# Patient Record
Sex: MALE | Race: WHITE | NOT HISPANIC OR LATINO | Employment: OTHER | URBAN - NONMETROPOLITAN AREA
[De-identification: names, ages, dates, MRNs, and addresses within clinical notes are randomized per-mention and may not be internally consistent; named-entity substitution may affect disease eponyms.]

---

## 2021-06-04 ENCOUNTER — HOSPITAL ENCOUNTER (EMERGENCY)
Facility: HOSPITAL | Age: 46
Discharge: HOME | End: 2021-06-04
Attending: EMERGENCY MEDICINE | Admitting: EMERGENCY MEDICINE

## 2021-06-04 ENCOUNTER — APPOINTMENT (OUTPATIENT)
Dept: CT IMAGING | Facility: HOSPITAL | Age: 46
End: 2021-06-04

## 2021-06-04 VITALS
SYSTOLIC BLOOD PRESSURE: 138 MMHG | BODY MASS INDEX: 21.67 KG/M2 | DIASTOLIC BLOOD PRESSURE: 84 MMHG | WEIGHT: 143 LBS | OXYGEN SATURATION: 97 % | TEMPERATURE: 97.9 F | HEIGHT: 68 IN | HEART RATE: 79 BPM | RESPIRATION RATE: 20 BRPM

## 2021-06-04 DIAGNOSIS — N30.01 ACUTE CYSTITIS WITH HEMATURIA: ICD-10-CM

## 2021-06-04 DIAGNOSIS — N20.1 URETEROLITHIASIS: Primary | ICD-10-CM

## 2021-06-04 LAB
ALBUMIN SERPL-MCNC: 4.23 G/DL (ref 3.5–5.2)
ALBUMIN/GLOB SERPL: 1.4 G/DL
ALP SERPL-CCNC: 70 U/L (ref 39–117)
ALT SERPL W P-5'-P-CCNC: 22 U/L (ref 1–41)
ANION GAP SERPL CALCULATED.3IONS-SCNC: 10.9 MMOL/L (ref 5–15)
AST SERPL-CCNC: 28 U/L (ref 1–40)
BACTERIA UR QL AUTO: ABNORMAL /HPF
BASOPHILS # BLD MANUAL: 0.15 10*3/MM3 (ref 0–0.2)
BASOPHILS NFR BLD AUTO: 2 % (ref 0–1.5)
BILIRUB SERPL-MCNC: 0.2 MG/DL (ref 0–1.2)
BILIRUB UR QL STRIP: NEGATIVE
BUN SERPL-MCNC: 13 MG/DL (ref 6–20)
BUN/CREAT SERPL: 16.3 (ref 7–25)
CALCIUM SPEC-SCNC: 9.1 MG/DL (ref 8.6–10.5)
CHLORIDE SERPL-SCNC: 106 MMOL/L (ref 98–107)
CLARITY UR: ABNORMAL
CO2 SERPL-SCNC: 28.1 MMOL/L (ref 22–29)
COLOR UR: ABNORMAL
CREAT SERPL-MCNC: 0.8 MG/DL (ref 0.76–1.27)
DEPRECATED RDW RBC AUTO: 49.8 FL (ref 37–54)
EOSINOPHIL # BLD MANUAL: 0.45 10*3/MM3 (ref 0–0.4)
EOSINOPHIL NFR BLD MANUAL: 6.1 % (ref 0.3–6.2)
ERYTHROCYTE [DISTWIDTH] IN BLOOD BY AUTOMATED COUNT: 13.9 % (ref 12.3–15.4)
GFR SERPL CREATININE-BSD FRML MDRD: 105 ML/MIN/1.73
GLOBULIN UR ELPH-MCNC: 3 GM/DL
GLUCOSE SERPL-MCNC: 95 MG/DL (ref 65–99)
GLUCOSE UR STRIP-MCNC: NEGATIVE MG/DL
HCT VFR BLD AUTO: 36.2 % (ref 37.5–51)
HGB BLD-MCNC: 11.5 G/DL (ref 13–17.7)
HGB UR QL STRIP.AUTO: ABNORMAL
HYALINE CASTS UR QL AUTO: ABNORMAL /LPF
KETONES UR QL STRIP: NEGATIVE
LEUKOCYTE ESTERASE UR QL STRIP.AUTO: ABNORMAL
LIPASE SERPL-CCNC: 16 U/L (ref 13–60)
LYMPHOCYTES # BLD MANUAL: 2.39 10*3/MM3 (ref 0.7–3.1)
LYMPHOCYTES NFR BLD MANUAL: 32.3 % (ref 19.6–45.3)
LYMPHOCYTES NFR BLD MANUAL: 4 % (ref 5–12)
MCH RBC QN AUTO: 31 PG (ref 26.6–33)
MCHC RBC AUTO-ENTMCNC: 31.8 G/DL (ref 31.5–35.7)
MCV RBC AUTO: 97.6 FL (ref 79–97)
METAMYELOCYTES NFR BLD MANUAL: 1 % (ref 0–0)
MONOCYTES # BLD AUTO: 0.3 10*3/MM3 (ref 0.1–0.9)
MYELOCYTES NFR BLD MANUAL: 2 % (ref 0–0)
NEUTROPHILS # BLD AUTO: 3.89 10*3/MM3 (ref 1.7–7)
NEUTROPHILS NFR BLD MANUAL: 52.5 % (ref 42.7–76)
NITRITE UR QL STRIP: NEGATIVE
PH UR STRIP.AUTO: 6.5 [PH] (ref 5–8)
PLATELET # BLD AUTO: 347 10*3/MM3 (ref 140–450)
PMV BLD AUTO: 9.2 FL (ref 6–12)
POTASSIUM SERPL-SCNC: 3.5 MMOL/L (ref 3.5–5.2)
PROT SERPL-MCNC: 7.2 G/DL (ref 6–8.5)
PROT UR QL STRIP: ABNORMAL
RBC # BLD AUTO: 3.71 10*6/MM3 (ref 4.14–5.8)
RBC # UR: ABNORMAL /HPF
RBC MORPH BLD: NORMAL
REF LAB TEST METHOD: ABNORMAL
SMALL PLATELETS BLD QL SMEAR: ADEQUATE
SODIUM SERPL-SCNC: 145 MMOL/L (ref 136–145)
SP GR UR STRIP: 1.02 (ref 1–1.03)
SQUAMOUS #/AREA URNS HPF: ABNORMAL /HPF
UROBILINOGEN UR QL STRIP: ABNORMAL
WBC # BLD AUTO: 7.4 10*3/MM3 (ref 3.4–10.8)
WBC UR QL AUTO: ABNORMAL /HPF

## 2021-06-04 PROCEDURE — 25010000002 MORPHINE PER 10 MG: Performed by: EMERGENCY MEDICINE

## 2021-06-04 PROCEDURE — 99283 EMERGENCY DEPT VISIT LOW MDM: CPT

## 2021-06-04 PROCEDURE — 85007 BL SMEAR W/DIFF WBC COUNT: CPT | Performed by: PHYSICIAN ASSISTANT

## 2021-06-04 PROCEDURE — 25010000002 CEFTRIAXONE PER 250 MG: Performed by: PHYSICIAN ASSISTANT

## 2021-06-04 PROCEDURE — 80053 COMPREHEN METABOLIC PANEL: CPT | Performed by: PHYSICIAN ASSISTANT

## 2021-06-04 PROCEDURE — 81001 URINALYSIS AUTO W/SCOPE: CPT | Performed by: PHYSICIAN ASSISTANT

## 2021-06-04 PROCEDURE — 85025 COMPLETE CBC W/AUTO DIFF WBC: CPT | Performed by: PHYSICIAN ASSISTANT

## 2021-06-04 PROCEDURE — 87086 URINE CULTURE/COLONY COUNT: CPT | Performed by: PHYSICIAN ASSISTANT

## 2021-06-04 PROCEDURE — 83690 ASSAY OF LIPASE: CPT | Performed by: PHYSICIAN ASSISTANT

## 2021-06-04 PROCEDURE — 74176 CT ABD & PELVIS W/O CONTRAST: CPT

## 2021-06-04 PROCEDURE — 96375 TX/PRO/DX INJ NEW DRUG ADDON: CPT

## 2021-06-04 PROCEDURE — 25010000002 ONDANSETRON PER 1 MG: Performed by: PHYSICIAN ASSISTANT

## 2021-06-04 PROCEDURE — 96365 THER/PROPH/DIAG IV INF INIT: CPT

## 2021-06-04 PROCEDURE — 25010000002 KETOROLAC TROMETHAMINE PER 15 MG: Performed by: PHYSICIAN ASSISTANT

## 2021-06-04 RX ORDER — KETOROLAC TROMETHAMINE 30 MG/ML
30 INJECTION, SOLUTION INTRAMUSCULAR; INTRAVENOUS ONCE
Status: COMPLETED | OUTPATIENT
Start: 2021-06-04 | End: 2021-06-04

## 2021-06-04 RX ORDER — SODIUM CHLORIDE 0.9 % (FLUSH) 0.9 %
10 SYRINGE (ML) INJECTION AS NEEDED
Status: DISCONTINUED | OUTPATIENT
Start: 2021-06-04 | End: 2021-06-05 | Stop reason: HOSPADM

## 2021-06-04 RX ORDER — HYDROCODONE BITARTRATE AND ACETAMINOPHEN 10; 325 MG/1; MG/1
1 TABLET ORAL EVERY 6 HOURS PRN
Qty: 10 TABLET | Refills: 0 | Status: SHIPPED | OUTPATIENT
Start: 2021-06-04

## 2021-06-04 RX ORDER — ONDANSETRON 2 MG/ML
4 INJECTION INTRAMUSCULAR; INTRAVENOUS ONCE
Status: COMPLETED | OUTPATIENT
Start: 2021-06-04 | End: 2021-06-04

## 2021-06-04 RX ORDER — CEPHALEXIN 500 MG/1
500 CAPSULE ORAL 2 TIMES DAILY
Qty: 14 CAPSULE | Refills: 0 | Status: SHIPPED | OUTPATIENT
Start: 2021-06-04 | End: 2021-06-11

## 2021-06-04 RX ORDER — TAMSULOSIN HYDROCHLORIDE 0.4 MG/1
1 CAPSULE ORAL DAILY
Qty: 10 CAPSULE | Refills: 0 | Status: SHIPPED | OUTPATIENT
Start: 2021-06-04 | End: 2021-06-14

## 2021-06-04 RX ADMIN — MORPHINE SULFATE 4 MG: 4 INJECTION, SOLUTION INTRAMUSCULAR; INTRAVENOUS at 22:22

## 2021-06-04 RX ADMIN — ONDANSETRON 4 MG: 2 INJECTION INTRAMUSCULAR; INTRAVENOUS at 21:50

## 2021-06-04 RX ADMIN — SODIUM CHLORIDE 1000 ML: 9 INJECTION, SOLUTION INTRAVENOUS at 21:49

## 2021-06-04 RX ADMIN — KETOROLAC TROMETHAMINE 30 MG: 30 INJECTION, SOLUTION INTRAMUSCULAR; INTRAVENOUS at 21:50

## 2021-06-04 RX ADMIN — CEFTRIAXONE 1 G: 1 INJECTION, POWDER, FOR SOLUTION INTRAMUSCULAR; INTRAVENOUS at 22:20

## 2021-06-05 NOTE — ED PROVIDER NOTES
Subjective   Patient is a 45-year-old male with history of kidney stones and restless leg syndrome presents to the ED with complaints of left-sided flank pain.  He states this started approximately a month ago but is gotten worse in the last several days.  He has a history of kidney stones his last kidney stone was approximately a month ago on the right side that he was able to pass.  He is complaining of an aching pain radiating into the suprapubic region.  He is also complaining of associated nausea and dysuria.  He denies chest pain, shortness of breath, fever, chills, vomiting, hematuria, or diarrhea.      History provided by:  Patient   used: No    Flank Pain  Pain location:  L flank  Pain quality: aching and cramping    Pain radiates to:  Suprapubic region  Pain severity:  Moderate  Onset quality:  Sudden  Duration:  2 days  Timing:  Constant  Progression:  Worsening  Chronicity:  Recurrent  Context: not alcohol use, not awakening from sleep, not diet changes, not eating, not laxative use, not medication withdrawal, not previous surgeries, not recent illness, not recent travel, not retching, not sick contacts, not suspicious food intake and not trauma    Relieved by:  Nothing  Worsened by:  Nothing  Ineffective treatments:  None tried  Associated symptoms: dysuria and nausea    Associated symptoms: no chest pain, no chills, no constipation, no cough, no diarrhea, no fatigue, no fever, no flatus, no hematemesis, no hematochezia, no hematuria, no shortness of breath, no sore throat and no vomiting        Review of Systems   Constitutional: Negative.  Negative for chills, diaphoresis, fatigue and fever.   HENT: Negative.  Negative for congestion, ear discharge, ear pain, facial swelling, hearing loss, postnasal drip, rhinorrhea, sinus pressure, sinus pain, sneezing, sore throat, tinnitus and trouble swallowing.    Eyes: Negative.  Negative for photophobia, pain, discharge, redness and itching.    Respiratory: Negative.  Negative for cough, shortness of breath and wheezing.    Cardiovascular: Negative.  Negative for chest pain, palpitations and leg swelling.   Gastrointestinal: Positive for nausea. Negative for abdominal distention, abdominal pain, constipation, diarrhea, flatus, hematemesis, hematochezia and vomiting.   Endocrine: Negative.    Genitourinary: Positive for dysuria and flank pain. Negative for difficulty urinating, frequency, hematuria and urgency.   Musculoskeletal: Negative for arthralgias, back pain, gait problem, joint swelling, myalgias, neck pain and neck stiffness.   Skin: Negative.    Neurological: Negative.  Negative for dizziness, syncope, facial asymmetry, weakness, light-headedness, numbness and headaches.   Psychiatric/Behavioral: Negative.  Negative for confusion.   All other systems reviewed and are negative.      Past Medical History:   Diagnosis Date   • Kidney stone    • Restless leg syndrome        No Known Allergies    Past Surgical History:   Procedure Laterality Date   • APPENDECTOMY     • BACK SURGERY     • DENTAL PROCEDURE     • KNEE ACL RECONSTRUCTION     • NECK SURGERY      x3       No family history on file.    Social History     Socioeconomic History   • Marital status: Single     Spouse name: Not on file   • Number of children: Not on file   • Years of education: Not on file   • Highest education level: Not on file   Tobacco Use   • Smoking status: Current Every Day Smoker     Packs/day: 0.50     Types: Cigarettes   Substance and Sexual Activity   • Alcohol use: No   • Drug use: No   • Sexual activity: Defer           Objective   Physical Exam  Vitals and nursing note reviewed.   Constitutional:       General: He is not in acute distress.     Appearance: He is well-developed. He is not diaphoretic.   HENT:      Head: Normocephalic and atraumatic.      Right Ear: External ear normal.      Left Ear: External ear normal.      Nose: Nose normal.      Mouth/Throat:       Mouth: Mucous membranes are moist.      Pharynx: Oropharynx is clear.   Eyes:      Extraocular Movements: Extraocular movements intact.      Conjunctiva/sclera: Conjunctivae normal.      Pupils: Pupils are equal, round, and reactive to light.   Neck:      Vascular: No JVD.      Trachea: No tracheal deviation.   Cardiovascular:      Rate and Rhythm: Normal rate and regular rhythm.      Heart sounds: Normal heart sounds. No murmur heard.     Pulmonary:      Effort: Pulmonary effort is normal. No respiratory distress.      Breath sounds: Normal breath sounds. No wheezing.   Abdominal:      General: Bowel sounds are normal. There is no distension.      Palpations: Abdomen is soft.      Tenderness: There is no abdominal tenderness. There is left CVA tenderness. There is no guarding or rebound.   Musculoskeletal:         General: No deformity. Normal range of motion.      Cervical back: Normal range of motion and neck supple.      Right lower leg: No edema.      Left lower leg: No edema.   Skin:     General: Skin is warm and dry.      Coloration: Skin is not pale.      Findings: No erythema or rash.   Neurological:      Mental Status: He is alert and oriented to person, place, and time.      Cranial Nerves: No cranial nerve deficit.   Psychiatric:         Behavior: Behavior normal.         Thought Content: Thought content normal.         Procedures           ED Course  ED Course as of Jun 04 2231   Fri Jun 04, 2021 2217 IMPRESSION:  Prominent stone is seen involving the patient's left UPJ. . The left renal collecting system proximal to this is slightly prominent although there is no gross hydronephrosis. It is uncertain if this stone may be contributing to left-sided renal colic.        Signer Name: Janet Bennett MD   Signed: 6/4/2021 10:01 PM   CT Abdomen Pelvis Stone Protocol []   2231 Discussed plan of care with patient.  Advised if symptoms worsen return to the ED.  Advised to follow-up with urology in 1 to 2 days.     []      ED Course User Index  [MH] Tiffanie Vora PA-C                                           [facility]    Final diagnoses:   Ureterolithiasis   Acute cystitis with hematuria       ED Disposition  ED Disposition     ED Disposition Condition Comment    Discharge Stable           Chace Capps MD  [address]  [facility] [address]  [phone]    Schedule an appointment as soon as possible for a visit in 1 day           Medication List      New Prescriptions    cephalexin 500 MG capsule  Commonly known as: KEFLEX  Take 1 capsule by mouth 2 (Two) Times a Day for 7 days.     tamsulosin 0.4 MG capsule 24 hr capsule  Commonly known as: FLOMAX  Take 1 capsule by mouth Daily for 10 days.           Where to Get Your Medications      You can get these medications from any pharmacy    Bring a paper prescription for each of these medications  · cephalexin 500 MG capsule  · tamsulosin 0.4 MG capsule 24 hr capsule          Tiffanie Vora PA-C  06/04/21 5545

## 2021-06-06 LAB — BACTERIA SPEC AEROBE CULT: NO GROWTH

## 2023-09-05 ENCOUNTER — HOSPITAL ENCOUNTER (INPATIENT)
Facility: HOSPITAL | Age: 48
LOS: 2 days | Discharge: HOME | DRG: 897 | End: 2023-09-07
Attending: PSYCHIATRY & NEUROLOGY | Admitting: PSYCHIATRY & NEUROLOGY
Payer: MEDICARE

## 2023-09-05 ENCOUNTER — HOSPITAL ENCOUNTER (EMERGENCY)
Facility: HOSPITAL | Age: 48
Discharge: STILL ADMITTED OR ADMINISTRATIVE | DRG: 897 | End: 2023-09-05
Attending: STUDENT IN AN ORGANIZED HEALTH CARE EDUCATION/TRAINING PROGRAM
Payer: MEDICARE

## 2023-09-05 VITALS
HEIGHT: 69 IN | WEIGHT: 150 LBS | SYSTOLIC BLOOD PRESSURE: 146 MMHG | HEART RATE: 94 BPM | BODY MASS INDEX: 22.22 KG/M2 | OXYGEN SATURATION: 97 % | RESPIRATION RATE: 20 BRPM | TEMPERATURE: 97.7 F | DIASTOLIC BLOOD PRESSURE: 82 MMHG

## 2023-09-05 DIAGNOSIS — F19.10 POLYSUBSTANCE ABUSE: Primary | ICD-10-CM

## 2023-09-05 PROBLEM — F11.20 OPIOID USE DISORDER, SEVERE, DEPENDENCE: Status: ACTIVE | Noted: 2023-09-05

## 2023-09-05 PROBLEM — F15.20 METHAMPHETAMINE USE DISORDER, SEVERE: Status: ACTIVE | Noted: 2023-09-05

## 2023-09-05 PROBLEM — F17.200 NICOTINE USE DISORDER: Status: ACTIVE | Noted: 2023-09-05

## 2023-09-05 PROBLEM — F11.93 OPIOID WITHDRAWAL: Status: ACTIVE | Noted: 2023-09-05

## 2023-09-05 LAB
ALBUMIN SERPL-MCNC: 4.4 G/DL (ref 3.5–5.2)
ALBUMIN/GLOB SERPL: 1.3 G/DL
ALP SERPL-CCNC: 83 U/L (ref 39–117)
ALT SERPL W P-5'-P-CCNC: 16 U/L (ref 1–41)
AMPHET+METHAMPHET UR QL: NEGATIVE
AMPHETAMINES UR QL: POSITIVE
ANION GAP SERPL CALCULATED.3IONS-SCNC: 11.9 MMOL/L (ref 5–15)
AST SERPL-CCNC: 22 U/L (ref 1–40)
BACTERIA UR QL AUTO: ABNORMAL /HPF
BARBITURATES UR QL SCN: NEGATIVE
BASOPHILS # BLD AUTO: 0.05 10*3/MM3 (ref 0–0.2)
BASOPHILS NFR BLD AUTO: 0.6 % (ref 0–1.5)
BENZODIAZ UR QL SCN: NEGATIVE
BILIRUB SERPL-MCNC: 0.3 MG/DL (ref 0–1.2)
BILIRUB UR QL STRIP: NEGATIVE
BUN SERPL-MCNC: 12 MG/DL (ref 6–20)
BUN/CREAT SERPL: 18.2 (ref 7–25)
BUPRENORPHINE SERPL-MCNC: POSITIVE NG/ML
CALCIUM SPEC-SCNC: 9.8 MG/DL (ref 8.6–10.5)
CANNABINOIDS SERPL QL: POSITIVE
CHLORIDE SERPL-SCNC: 104 MMOL/L (ref 98–107)
CLARITY UR: CLEAR
CO2 SERPL-SCNC: 23.1 MMOL/L (ref 22–29)
COCAINE UR QL: NEGATIVE
COD CRY URNS QL: ABNORMAL /HPF
COLOR UR: YELLOW
CREAT SERPL-MCNC: 0.66 MG/DL (ref 0.76–1.27)
DEPRECATED RDW RBC AUTO: 44.2 FL (ref 37–54)
EGFRCR SERPLBLD CKD-EPI 2021: 116.4 ML/MIN/1.73
EOSINOPHIL # BLD AUTO: 0 10*3/MM3 (ref 0–0.4)
EOSINOPHIL NFR BLD AUTO: 0 % (ref 0.3–6.2)
ERYTHROCYTE [DISTWIDTH] IN BLOOD BY AUTOMATED COUNT: 13.3 % (ref 12.3–15.4)
ETHANOL BLD-MCNC: <10 MG/DL (ref 0–10)
ETHANOL UR QL: <0.01 %
FENTANYL UR-MCNC: POSITIVE NG/ML
GLOBULIN UR ELPH-MCNC: 3.5 GM/DL
GLUCOSE SERPL-MCNC: 133 MG/DL (ref 65–99)
GLUCOSE UR STRIP-MCNC: NEGATIVE MG/DL
HAV IGM SERPL QL IA: NORMAL
HBV CORE IGM SERPL QL IA: NORMAL
HBV SURFACE AG SERPL QL IA: NORMAL
HCT VFR BLD AUTO: 40.9 % (ref 37.5–51)
HCV AB SER DONR QL: NORMAL
HGB BLD-MCNC: 13.3 G/DL (ref 13–17.7)
HGB UR QL STRIP.AUTO: NEGATIVE
HYALINE CASTS UR QL AUTO: ABNORMAL /LPF
IMM GRANULOCYTES # BLD AUTO: 0.03 10*3/MM3 (ref 0–0.05)
IMM GRANULOCYTES NFR BLD AUTO: 0.3 % (ref 0–0.5)
KETONES UR QL STRIP: ABNORMAL
LEUKOCYTE ESTERASE UR QL STRIP.AUTO: ABNORMAL
LYMPHOCYTES # BLD AUTO: 0.97 10*3/MM3 (ref 0.7–3.1)
LYMPHOCYTES NFR BLD AUTO: 10.9 % (ref 19.6–45.3)
MAGNESIUM SERPL-MCNC: 2.1 MG/DL (ref 1.6–2.6)
MCH RBC QN AUTO: 29.5 PG (ref 26.6–33)
MCHC RBC AUTO-ENTMCNC: 32.5 G/DL (ref 31.5–35.7)
MCV RBC AUTO: 90.7 FL (ref 79–97)
METHADONE UR QL SCN: NEGATIVE
MONOCYTES # BLD AUTO: 0.36 10*3/MM3 (ref 0.1–0.9)
MONOCYTES NFR BLD AUTO: 4 % (ref 5–12)
MUCOUS THREADS URNS QL MICRO: ABNORMAL /HPF
NEUTROPHILS NFR BLD AUTO: 7.48 10*3/MM3 (ref 1.7–7)
NEUTROPHILS NFR BLD AUTO: 84.2 % (ref 42.7–76)
NITRITE UR QL STRIP: NEGATIVE
NRBC BLD AUTO-RTO: 0 /100 WBC (ref 0–0.2)
OPIATES UR QL: NEGATIVE
OXYCODONE UR QL SCN: NEGATIVE
PCP UR QL SCN: NEGATIVE
PH UR STRIP.AUTO: 6.5 [PH] (ref 5–8)
PLATELET # BLD AUTO: 451 10*3/MM3 (ref 140–450)
PMV BLD AUTO: 9 FL (ref 6–12)
POTASSIUM SERPL-SCNC: 3.5 MMOL/L (ref 3.5–5.2)
PROPOXYPH UR QL: NEGATIVE
PROT SERPL-MCNC: 7.9 G/DL (ref 6–8.5)
PROT UR QL STRIP: ABNORMAL
RBC # BLD AUTO: 4.51 10*6/MM3 (ref 4.14–5.8)
RBC # UR STRIP: ABNORMAL /HPF
REF LAB TEST METHOD: ABNORMAL
SODIUM SERPL-SCNC: 139 MMOL/L (ref 136–145)
SP GR UR STRIP: 1.03 (ref 1–1.03)
SQUAMOUS #/AREA URNS HPF: ABNORMAL /HPF
TRICYCLICS UR QL SCN: NEGATIVE
UROBILINOGEN UR QL STRIP: ABNORMAL
WBC # UR STRIP: ABNORMAL /HPF
WBC NRBC COR # BLD: 8.89 10*3/MM3 (ref 3.4–10.8)

## 2023-09-05 PROCEDURE — 93005 ELECTROCARDIOGRAM TRACING: CPT | Performed by: PSYCHIATRY & NEUROLOGY

## 2023-09-05 PROCEDURE — 85025 COMPLETE CBC W/AUTO DIFF WBC: CPT | Performed by: STUDENT IN AN ORGANIZED HEALTH CARE EDUCATION/TRAINING PROGRAM

## 2023-09-05 PROCEDURE — 82077 ASSAY SPEC XCP UR&BREATH IA: CPT | Performed by: STUDENT IN AN ORGANIZED HEALTH CARE EDUCATION/TRAINING PROGRAM

## 2023-09-05 PROCEDURE — 63710000001 ONDANSETRON PER 8 MG: Performed by: PSYCHIATRY & NEUROLOGY

## 2023-09-05 PROCEDURE — HZ2ZZZZ DETOXIFICATION SERVICES FOR SUBSTANCE ABUSE TREATMENT: ICD-10-PCS | Performed by: PSYCHIATRY & NEUROLOGY

## 2023-09-05 PROCEDURE — 83735 ASSAY OF MAGNESIUM: CPT | Performed by: STUDENT IN AN ORGANIZED HEALTH CARE EDUCATION/TRAINING PROGRAM

## 2023-09-05 PROCEDURE — 99222 1ST HOSP IP/OBS MODERATE 55: CPT | Performed by: PSYCHIATRY & NEUROLOGY

## 2023-09-05 PROCEDURE — 25010000002 LORAZEPAM PER 2 MG: Performed by: STUDENT IN AN ORGANIZED HEALTH CARE EDUCATION/TRAINING PROGRAM

## 2023-09-05 PROCEDURE — 80053 COMPREHEN METABOLIC PANEL: CPT | Performed by: STUDENT IN AN ORGANIZED HEALTH CARE EDUCATION/TRAINING PROGRAM

## 2023-09-05 PROCEDURE — 36415 COLL VENOUS BLD VENIPUNCTURE: CPT

## 2023-09-05 PROCEDURE — 99285 EMERGENCY DEPT VISIT HI MDM: CPT

## 2023-09-05 PROCEDURE — 80074 ACUTE HEPATITIS PANEL: CPT | Performed by: PSYCHIATRY & NEUROLOGY

## 2023-09-05 PROCEDURE — 81001 URINALYSIS AUTO W/SCOPE: CPT | Performed by: STUDENT IN AN ORGANIZED HEALTH CARE EDUCATION/TRAINING PROGRAM

## 2023-09-05 PROCEDURE — 80307 DRUG TEST PRSMV CHEM ANLYZR: CPT | Performed by: STUDENT IN AN ORGANIZED HEALTH CARE EDUCATION/TRAINING PROGRAM

## 2023-09-05 PROCEDURE — 96372 THER/PROPH/DIAG INJ SC/IM: CPT

## 2023-09-05 RX ORDER — FAMOTIDINE 20 MG/1
20 TABLET, FILM COATED ORAL 2 TIMES DAILY PRN
Status: DISCONTINUED | OUTPATIENT
Start: 2023-09-05 | End: 2023-09-07 | Stop reason: HOSPADM

## 2023-09-05 RX ORDER — HYDROXYZINE 50 MG/1
50 TABLET, FILM COATED ORAL EVERY 6 HOURS PRN
Status: DISCONTINUED | OUTPATIENT
Start: 2023-09-05 | End: 2023-09-07 | Stop reason: HOSPADM

## 2023-09-05 RX ORDER — CLONIDINE HYDROCHLORIDE 0.1 MG/1
0.1 TABLET ORAL 4 TIMES DAILY PRN
Status: DISPENSED | OUTPATIENT
Start: 2023-09-05 | End: 2023-09-06

## 2023-09-05 RX ORDER — BENZTROPINE MESYLATE 1 MG/1
2 TABLET ORAL ONCE AS NEEDED
Status: DISCONTINUED | OUTPATIENT
Start: 2023-09-05 | End: 2023-09-07 | Stop reason: HOSPADM

## 2023-09-05 RX ORDER — CYCLOBENZAPRINE HCL 10 MG
10 TABLET ORAL 3 TIMES DAILY PRN
Status: DISCONTINUED | OUTPATIENT
Start: 2023-09-05 | End: 2023-09-07 | Stop reason: HOSPADM

## 2023-09-05 RX ORDER — IBUPROFEN 400 MG/1
400 TABLET ORAL EVERY 6 HOURS PRN
Status: DISCONTINUED | OUTPATIENT
Start: 2023-09-05 | End: 2023-09-07 | Stop reason: HOSPADM

## 2023-09-05 RX ORDER — ECHINACEA PURPUREA EXTRACT 125 MG
2 TABLET ORAL AS NEEDED
Status: DISCONTINUED | OUTPATIENT
Start: 2023-09-05 | End: 2023-09-07 | Stop reason: HOSPADM

## 2023-09-05 RX ORDER — BENZTROPINE MESYLATE 1 MG/ML
1 INJECTION INTRAMUSCULAR; INTRAVENOUS ONCE AS NEEDED
Status: DISCONTINUED | OUTPATIENT
Start: 2023-09-05 | End: 2023-09-07 | Stop reason: HOSPADM

## 2023-09-05 RX ORDER — CLONIDINE HYDROCHLORIDE 0.1 MG/1
0.1 TABLET ORAL ONCE AS NEEDED
Status: DISCONTINUED | OUTPATIENT
Start: 2023-09-09 | End: 2023-09-07 | Stop reason: HOSPADM

## 2023-09-05 RX ORDER — CLONIDINE HYDROCHLORIDE 0.1 MG/1
0.1 TABLET ORAL 3 TIMES DAILY PRN
Status: DISCONTINUED | OUTPATIENT
Start: 2023-09-07 | End: 2023-09-07 | Stop reason: HOSPADM

## 2023-09-05 RX ORDER — ACETAMINOPHEN 325 MG/1
650 TABLET ORAL EVERY 6 HOURS PRN
Status: DISCONTINUED | OUTPATIENT
Start: 2023-09-05 | End: 2023-09-07 | Stop reason: HOSPADM

## 2023-09-05 RX ORDER — HYDRALAZINE HYDROCHLORIDE 25 MG/1
25 TABLET, FILM COATED ORAL DAILY PRN
Status: DISCONTINUED | OUTPATIENT
Start: 2023-09-05 | End: 2023-09-07 | Stop reason: HOSPADM

## 2023-09-05 RX ORDER — TRAZODONE HYDROCHLORIDE 50 MG/1
50 TABLET ORAL NIGHTLY PRN
Status: DISCONTINUED | OUTPATIENT
Start: 2023-09-05 | End: 2023-09-07 | Stop reason: HOSPADM

## 2023-09-05 RX ORDER — NICOTINE 21 MG/24HR
1 PATCH, TRANSDERMAL 24 HOURS TRANSDERMAL
Status: DISCONTINUED | OUTPATIENT
Start: 2023-09-05 | End: 2023-09-07 | Stop reason: HOSPADM

## 2023-09-05 RX ORDER — DICYCLOMINE HYDROCHLORIDE 10 MG/1
10 CAPSULE ORAL 3 TIMES DAILY PRN
Status: DISCONTINUED | OUTPATIENT
Start: 2023-09-05 | End: 2023-09-07 | Stop reason: HOSPADM

## 2023-09-05 RX ORDER — CLONIDINE HYDROCHLORIDE 0.1 MG/1
0.1 TABLET ORAL 4 TIMES DAILY PRN
Status: ACTIVE | OUTPATIENT
Start: 2023-09-06 | End: 2023-09-07

## 2023-09-05 RX ORDER — LORAZEPAM 2 MG/ML
2 INJECTION INTRAMUSCULAR ONCE
Status: COMPLETED | OUTPATIENT
Start: 2023-09-05 | End: 2023-09-05

## 2023-09-05 RX ORDER — BENZONATATE 100 MG/1
100 CAPSULE ORAL 3 TIMES DAILY PRN
Status: DISCONTINUED | OUTPATIENT
Start: 2023-09-05 | End: 2023-09-07 | Stop reason: HOSPADM

## 2023-09-05 RX ORDER — ALUMINA, MAGNESIA, AND SIMETHICONE 2400; 2400; 240 MG/30ML; MG/30ML; MG/30ML
15 SUSPENSION ORAL EVERY 6 HOURS PRN
Status: DISCONTINUED | OUTPATIENT
Start: 2023-09-05 | End: 2023-09-07 | Stop reason: HOSPADM

## 2023-09-05 RX ORDER — CLONIDINE HYDROCHLORIDE 0.1 MG/1
0.1 TABLET ORAL 2 TIMES DAILY PRN
Status: DISCONTINUED | OUTPATIENT
Start: 2023-09-08 | End: 2023-09-07 | Stop reason: HOSPADM

## 2023-09-05 RX ORDER — ONDANSETRON 4 MG/1
4 TABLET, FILM COATED ORAL EVERY 6 HOURS PRN
Status: DISCONTINUED | OUTPATIENT
Start: 2023-09-05 | End: 2023-09-07 | Stop reason: HOSPADM

## 2023-09-05 RX ORDER — LOPERAMIDE HYDROCHLORIDE 2 MG/1
2 CAPSULE ORAL
Status: DISCONTINUED | OUTPATIENT
Start: 2023-09-05 | End: 2023-09-07 | Stop reason: HOSPADM

## 2023-09-05 RX ADMIN — CYCLOBENZAPRINE 10 MG: 10 TABLET, FILM COATED ORAL at 08:22

## 2023-09-05 RX ADMIN — DICYCLOMINE HYDROCHLORIDE 10 MG: 10 CAPSULE ORAL at 08:22

## 2023-09-05 RX ADMIN — IBUPROFEN 400 MG: 400 TABLET, FILM COATED ORAL at 08:22

## 2023-09-05 RX ADMIN — CLONIDINE HYDROCHLORIDE 0.1 MG: 0.1 TABLET ORAL at 08:22

## 2023-09-05 RX ADMIN — ONDANSETRON HYDROCHLORIDE 4 MG: 4 TABLET, FILM COATED ORAL at 08:22

## 2023-09-05 RX ADMIN — HYDROXYZINE HYDROCHLORIDE 50 MG: 50 TABLET ORAL at 15:27

## 2023-09-05 RX ADMIN — HYDROXYZINE HYDROCHLORIDE 50 MG: 50 TABLET ORAL at 08:22

## 2023-09-05 RX ADMIN — LORAZEPAM 2 MG: 2 INJECTION INTRAMUSCULAR; INTRAVENOUS at 05:18

## 2023-09-05 RX ADMIN — TRAZODONE HYDROCHLORIDE 50 MG: 50 TABLET ORAL at 21:29

## 2023-09-05 RX ADMIN — HYDROXYZINE HYDROCHLORIDE 50 MG: 50 TABLET ORAL at 21:29

## 2023-09-05 NOTE — H&P
INITIAL PSYCHIATRIC HISTORY & PHYSICAL    Patient Identification:  Name:  Anatoly Buenrostro  Age:  47 y.o.  Sex:  male  :  1975  MRN:  [id number]   Visit Number:  [id number]  Primary Care Physician:  Provider, No Known    SUBJECTIVE    CC/Focus of Exam: opioid and meth use    HPI: Anatoly Buenrostro is a 47 y.o. male who was admitted on 2023 with complaints of opioid and meth use and withdrawals. The patient reports a long history of substance use. First use was at age 18 when he was prescribed pain medications for neck injury. He continued to use after the prescription was stopped. He added meth to the mix in his 30s. Over time the use increased and the patient  continued to use despite negative consequences. The patient endorses symptoms of tolerance and withdrawals. Has tried to cut down and stop but has not been successful. Spends too much time and resources in pursuit of substance use. Longest period of sobriety is reported to be a couple of weeks. .  Currently using heroin as much as he can via snorting, and a little bit of meth daily intranasally.   Last use was two days ago.   Withdrawal symptoms include body aches, cramping in stomach, arms and legs, anxiety, restlessness, poor sleep.     PAST PSYCHIATRIC HX: None reported.     SUBSTANCE USE HX: See HPI.     SOCIAL HX:   Social History     Socioeconomic History    Marital status: Single   Tobacco Use    Smoking status: Every Day     Packs/day: 1.00     Years: 30.00     Pack years: 30.00     Types: Cigarettes   Vaping Use    Vaping Use: Never used   Substance and Sexual Activity    Alcohol use: No    Drug use: Yes     Types: Heroin, Methamphetamines    Sexual activity: Defer         Past Medical History:   Diagnosis Date    Kidney stone     Restless leg syndrome     Substance abuse           Past Surgical History:   Procedure Laterality Date    APPENDECTOMY      BACK SURGERY      DENTAL PROCEDURE      KNEE ACL RECONSTRUCTION      NECK SURGERY      x3        History reviewed. No pertinent family history.      No medications prior to admission.         ALLERGIES:  Patient has no known allergies.    Temp:  [97.7 °F (36.5 °C)-98.5 °F (36.9 °C)] 98.5 °F (36.9 °C)  Heart Rate:  [] 93  Resp:  [18-20] 18  BP: (110-154)/(69-96) 115/75    REVIEW OF SYSTEMS:  Review of Systems   Constitutional:  Positive for diaphoresis and fatigue.   HENT:  Positive for congestion.    Eyes: Negative.    Respiratory: Negative.     Cardiovascular: Negative.    Gastrointestinal:  Positive for nausea.   Endocrine: Negative.    Genitourinary: Negative.    Musculoskeletal:  Positive for arthralgias and myalgias.   Skin: Negative.    Allergic/Immunologic: Negative.    Neurological:  Positive for weakness.   Psychiatric/Behavioral:  Positive for dysphoric mood. The patient is nervous/anxious.       OBJECTIVE    PHYSICAL EXAM:  Physical Exam  Constitutional:  Appears well-developed and well-nourished.   HENT:   Head: Normocephalic and atraumatic.   Right Ear: External ear normal.   Left Ear: External ear normal.   Mouth/Throat: Oropharynx is clear and moist.   Eyes: Pupils are equal, round, and reactive to light. Conjunctivae and EOM are normal.   Neck: Normal range of motion. Neck supple.   Cardiovascular: Normal rate, regular rhythm and normal heart sounds.    Respiratory: Effort normal and breath sounds normal. No respiratory distress. No wheezes.   GI: Soft. Bowel sounds are normal.No distension. There is no tenderness.   Musculoskeletal: Normal range of motion. No edema or deformity.   Neurological:No cranial nerve deficit. Coordination normal.   Skin: Skin is warm and dry. No rash noted. No erythema.     MENTAL STATUS EXAM:   Hygiene:   poor  Cooperation:  Cooperative  Eye Contact:  Fair  Psychomotor Behavior:  Slow  Affect:  Restricted  Hopelessness: Denies  Speech:  Normal  Thought Progress: Goal directed  Thought Content:  Normal  Suicidal:  None  Homicidal:  None  Hallucinations:   None  Delusion:  None  Memory:  Intact  Orientation:  Person, Place, Time, and Situation  Reliability:  fair  Insight:  Fair  Judgement:  Fair  Impulse Control:  Fair    Imaging Results (Last 24 Hours)       ** No results found for the last 24 hours. **             ECG/EMG Results (most recent)       None             Lab Results   Component Value Date    GLUCOSE 133 (H) 09/05/2023    BUN 12 09/05/2023    CREATININE 0.66 (L) 09/05/2023    EGFRIFNONA 105 06/04/2021    BCR 18.2 09/05/2023    CO2 23.1 09/05/2023    CALCIUM 9.8 09/05/2023    ALBUMIN 4.4 09/05/2023    LABIL2 1.4 (L) 02/12/2016    AST 22 09/05/2023    ALT 16 09/05/2023       Lab Results   Component Value Date    WBC 8.89 09/05/2023    HGB 13.3 09/05/2023    HCT 40.9 09/05/2023    MCV 90.7 09/05/2023     (H) 09/05/2023       Last Urine Toxicity  More data exists         Latest Ref Rng & Units 9/5/2023 7/16/2016   LAST URINE TOXICITY RESULTS   Amphetamine, Urine Qual Negative Negative  Negative    Barbiturates Screen, Urine Negative Negative  Negative    Benzodiazepine Screen, Urine Negative Negative  Negative    Buprenorphine, Screen, Urine Negative Positive  -   Cocaine Screen, Urine Negative Negative  Negative    Fentanyl, Urine Negative Positive  -   Methadone Screen , Urine Negative Negative  Negative    Methamphetamine, Ur Negative Positive  -       Brief Urine Lab Results  (Last result in the past 365 days)        Color   Clarity   Blood   Leuk Est   Nitrite   Protein   CREAT   Urine HCG        09/05/23 0438 Yellow   Clear   Negative   Trace   Negative   Trace                   DATA  Labs reviewed. Glucose 133. Platelets 451. Ketones 15, trace leukocyte esterase, trace protein, trace bacteria. UDS positive for buprenorphine, fentanyl, methamphetamine, thc.   EKG reviewed. Pending  FERNANDO reviewed.   Record reviewed. No previous treatment noted in this hospital for mental health or substance use problems.       Strengths: Motivated for  treatment    Weaknesses:Substance use and Poor coping skills    Code status:  Full  Discussed code status with patient.    ASSESSMENT & PLAN:        Opioid use disorder, severe, dependence    Opioid withdrawal  -Clonidine detox  -Comfort meds      Methamphetamine use disorder, severe  -Supportive treatment      Nicotine use disorder  -Encourage cessation      The patient has been admitted for safety and stabilization.  Patient will be monitored for suicidality daily and maintained on Special Precautions Level 4 (q30 min checks).  The patient will have individual and group therapy with a master's level therapist. A master treatment plan will be developed and agreed upon by the patient and his/her treatment team.  The patient's estimated length of stay in the hospital is 5-7 days.

## 2023-09-05 NOTE — NURSING NOTE
Spoke with Dr. Cohen, discussed assessment and labs, new orders to admit the patient to detox with routine orders, SP4, clonidine detox protocol. [id number]

## 2023-09-05 NOTE — PLAN OF CARE
Problem: Adult Behavioral Health Plan of Care  Goal: Plan of Care Review  Outcome: Ongoing, Progressing  Flowsheets (Taken 9/5/2023 4273)  Progress: no change  Plan of Care Reviewed With: patient  Patient Agreement with Plan of Care: agrees  Outcome Evaluation: Pt restless and can't be still during assessment.Pt having several somatic complaints.   Goal Outcome Evaluation:  Plan of Care Reviewed With: patient  Patient Agreement with Plan of Care: agrees     Progress: no change  Outcome Evaluation: Pt restless and can't be still during assessment.Pt having several somatic complaints.

## 2023-09-05 NOTE — ED PROVIDER NOTES
Subjective     History provided by:  Patient  Mental Health Problem  Presenting symptoms: agitation and bizarre behavior    Degree of incapacity (severity):  Severe  Onset quality:  Sudden  Duration:  1 day  Timing:  Constant  Progression:  Worsening  Chronicity:  Recurrent  Context: drug abuse    Treatment compliance:  Some of the time  Associated symptoms: anxiety and irritability    Associated symptoms: no abdominal pain and no chest pain    Risk factors: hx of mental illness      Review of Systems   Constitutional:  Positive for irritability. Negative for fever.   HENT: Negative.     Respiratory: Negative.     Cardiovascular: Negative.  Negative for chest pain.   Gastrointestinal: Negative.  Negative for abdominal pain.   Endocrine: Negative.    Genitourinary: Negative.  Negative for dysuria.   Skin: Negative.    Neurological: Negative.    Psychiatric/Behavioral:  Positive for agitation and behavioral problems. The patient is nervous/anxious.    All other systems reviewed and are negative.    Past Medical History:   Diagnosis Date    Kidney stone     Restless leg syndrome     Substance abuse        No Known Allergies    Past Surgical History:   Procedure Laterality Date    APPENDECTOMY      BACK SURGERY      DENTAL PROCEDURE      KNEE ACL RECONSTRUCTION      NECK SURGERY      x3       History reviewed. No pertinent family history.    Social History     Socioeconomic History    Marital status: Single   Tobacco Use    Smoking status: Every Day     Packs/day: 1.00     Years: 30.00     Pack years: 30.00     Types: Cigarettes   Vaping Use    Vaping Use: Never used   Substance and Sexual Activity    Alcohol use: No    Drug use: Yes     Types: Heroin, Methamphetamines    Sexual activity: Defer           Objective   Physical Exam  Vitals and nursing note reviewed.   Constitutional:       General: He is not in acute distress.     Appearance: He is well-developed. He is not diaphoretic.   HENT:      Head: Normocephalic  and atraumatic.      Right Ear: External ear normal.      Left Ear: External ear normal.      Nose: Nose normal.   Eyes:      Conjunctiva/sclera: Conjunctivae normal.   Neck:      Vascular: No JVD.      Trachea: No tracheal deviation.   Cardiovascular:      Rate and Rhythm: Normal rate.      Heart sounds: No murmur heard.  Pulmonary:      Effort: Pulmonary effort is normal. No respiratory distress.      Breath sounds: No wheezing.   Abdominal:      Palpations: Abdomen is soft.      Tenderness: There is no abdominal tenderness.   Musculoskeletal:         General: No deformity. Normal range of motion.      Cervical back: Normal range of motion and neck supple.   Skin:     General: Skin is warm and dry.      Coloration: Skin is not pale.      Findings: No erythema or rash.   Neurological:      Mental Status: He is alert and oriented to person, place, and time.      Cranial Nerves: No cranial nerve deficit.   Psychiatric:      Comments: Patient verbalized polysubstance abuse.  Patient wishes to detox off of methamphetamine and heroin.  Last use was 1 day prior to arrival.       Procedures           ED Course  ED Course as of 09/05/23 0612   Tue Sep 05, 2023   0546 Patient been evaluated by our behavioral health department and deemed appropriate for admission. [RB]      ED Course User Index  [RB] Henrry Chow II, PA                                           Medical Decision Making  47-year-old with polysubstance abuse.  Presents requesting detox of heroin and methamphetamines.    Problems Addressed:  Polysubstance abuse: complicated acute illness or injury     Details: Patient was evaluated in the emergency department by our behavioral health department and deemed the patient benefit from inpatient detox.  Patient be admitted.    Amount and/or Complexity of Data Reviewed  Labs: ordered.    Risk  Prescription drug management.        Final diagnoses:   Polysubstance abuse       ED Disposition  ED Disposition       ED  Disposition   DC/Transfer to Behavioral Health    Condition   --    Comment   --               No follow-up provider specified.       Medication List      No changes were made to your prescriptions during this visit.            Henrry Chow II, PA  09/05/23 0604

## 2023-09-05 NOTE — PLAN OF CARE
Problem: Adult Behavioral Health Plan of Care  Goal: Plan of Care Review  Outcome: Ongoing, Progressing  Flowsheets (Taken 9/5/2023 1650)  Consent Given to Review Plan with: no consent  Progress: no change  Plan of Care Reviewed With: patient  Patient Agreement with Plan of Care: agrees  Outcome Evaluation: Reviewed plan of care and completed adult social history.  Goal: Patient-Specific Goal (Individualization)  Outcome: Ongoing, Progressing  Flowsheets  Taken 9/5/2023 1650  Patient-Specific Goals (Include Timeframe): Anatoly to complete medical detox prior to discharge, identify 1-2 healthy coping skills to assist with relapse prevention during patients 3-7 day hospital stay, engage in safe disposition planning prior to discharge  Individualized Care Needs: Medication management, individual and group therapy.  Anxieties, Fears or Concerns: none reported  Taken 9/5/2023 1633  Patient Personal Strengths:   expressive of needs   motivated for treatment   expressive of emotions   resourceful  Patient Vulnerabilities: substance abuse/addiction  Goal: Optimized Coping Skills in Response to Life Stressors  Outcome: Ongoing, Progressing  Flowsheets (Taken 9/5/2023 1650)  Optimized Coping Skills in Response to Life Stressors: making progress toward outcome  Intervention: Promote Effective Coping Strategies  Flowsheets (Taken 9/5/2023 1650)  Supportive Measures:   active listening utilized   positive reinforcement provided   self-responsibility promoted   counseling provided   problem-solving facilitated   decision-making supported   verbalization of feelings encouraged   goal-setting facilitated   self-care encouraged   self-reflection promoted  Goal: Develops/Participates in Therapeutic [place] to Support Successful Transition  Outcome: Ongoing, Progressing  Flowsheets (Taken 9/5/2023 1650)  Develops/Participates in Therapeutic [place] to Support Successful Transition: making progress toward outcome  Intervention: Foster  Therapeutic [place]  Flowsheets (Taken 9/5/2023 1650)  Trust Relationship/Rapport:   care explained   reassurance provided   choices provided   thoughts/feelings acknowledged   emotional support provided   empathic listening provided   questions answered   questions encouraged  Intervention: Mutually Develop Transition Plan  Flowsheets  Taken 9/5/2023 1650  Transition Support:   community resources reviewed   crisis management plan promoted   follow-up care discussed  Readmission Within the Last 30 Days: no previous admission in last 30 days  Taken 9/5/2023 1648  Discharge Coordination/Progress: Patient has [employer] Re[employer] insurance, may require assistance with transportation and reports he is considering aftercare options currently.  Transportation Anticipated: public transportation  Transportation Concerns: no car  Current Discharge Risk: substance use/abuse  Concerns to be Addressed:   homelessness   substance/tobacco abuse/use  Readmission Within the Last 30 Days: no previous admission in last 30 days  Patient/Family Anticipated Services at Transition: none  Patient's Choice of Community Agency(s): patient considering options  Patient/Family Anticipates Transition to: (patient considering today) other (see comments)  Offered/Gave Vendor List: no   Goal Outcome Evaluation:  Plan of Care Reviewed With: patient  Patient Agreement with Plan of Care: agrees  Consent Given to Review Plan with: no consent  Progress: no change  Outcome Evaluation: Reviewed plan of care and completed adult social history.    DATA:         Therapist met individually with patient this date to introduce role and to discuss hospitalization expectations. Patient agreeable.      Clinical Maneuvering/Intervention:     Therapist assisted patient in processing above session content; acknowledged and normalized patient’s thoughts, feelings, and concerns.  Discussed the therapist/patient relationship and explain the parameters and  "limitations of relative confidentiality.  Also discussed the importance of active participation, and honesty to the treatment process.  Encouraged the patient to discuss/vent their feelings, frustrations, and fears concerning their ongoing medical issues and validated their feelings.     Discussed the importance of finding enjoyable activities and coping skills that the patient can engage in a regular basis. Discussed healthy coping skills such as distraction, self love, grounding, thought challenges/reframing, etc.  Provided patient with list of healthy coping skills this date. Discussed the importance of medication compliance.  Praised the patient for seeking help and spent the majority of the session building rapport.       Allowed patient to freely discuss issues without interruption or judgment. Provided safe, confidential environment to facilitate the development of positive therapeutic relationship and encourage open, honest communication.      Therapist addressed discharge safety planning this date. Assisted patient in identifying risk factors which would indicate the need for higher level of care after discharge;  including thoughts to harm self or others and/or self-harming behavior. Encouraged patient to call 911, or present to the nearest emergency room should any of these events occur. Discussed crisis intervention services and means to access.  Encouraged securing any objects of harm.       Therapist completed integrated summary, treatment plan, and initiated social history this date.  Therapist is strongly encouraging family involvement in treatment.       ASSESSMENT:      Patient is a 47 year old homeless, unemployed male from [facility]. Patient presents requesting medical detox from daily substance abuse issues including opiates, methamphetamine and marijuana. He reports using $50-$100 worth daily for 5 years. He also reports using using \"a bump\" of meth daily as well for a couple years, last use " was also yesterday morning. His reported COWS was 15 on intake. Patient denied SI/HI/AVH. He reports his longest period of sobriety 2 days. Patients UDS positive for THC, Methamphetamines, fentanyl, and suboxone. Patient reports he is considering attending treatment following stabilization.      PLAN:       Patient to remain hospitalized this date.     Treatment team will focus efforts on stabilizing patient's acute symptoms while providing education on healthy coping and crisis management to reduce hospitalizations.   Patient requires daily psychiatrist evaluation and 24/7 nursing supervision to promote patient  safety.     Therapist will offer 1-4 individual sessions, 1 therapy group daily, family education, and appropriate referral.    Therapist recommends residential [facility] treatment.  Patient is considering options currently.

## 2023-09-05 NOTE — NURSING NOTE
"     Patient presented to intake requesting detox from meth and heroin. He reports using $50-$100 worth daily for 5 years. Last use was yesterday morning. He also reports using using \"a bump\" of meth daily as well for a couple years, last use was also yesterday morning. COWS 15.  Patient denied SI/HI/AVH. Longest period of sobriety 2 days. UDS positive for THC, Methamphetamines, fentanyl, and suboxone.      Patient received 2mg Ativan IM in intake.        Lymphocyte % 10.9  Creatinine 0.66  Glucose 133  Pt rates anx 10/10  Pt rates dep 10/10  COWS 9        "

## 2023-09-05 NOTE — NURSING NOTE
"Patient presented to intake requesting detox from meth and heroin. He reports using $50-$100 worth daily for 5 years. Last use was yesterday morning. He also reports using using \"a bump\" of meth daily as well for a couple years, last use was also yesterday morning. COWS 15.  Patient denied SI/HI/AVH. Longest period of sobriety 2 days. UDS positive for THC, Methamphetamines, fentanyl, and suboxone.     Patient received 2mg Ativan IM in intake.      Lymphocyte % 10.9  Creatinine 0.66  Glucose 133    Other labs unremarkable     "

## 2023-09-06 PROCEDURE — 63710000001 ONDANSETRON PER 8 MG: Performed by: PSYCHIATRY & NEUROLOGY

## 2023-09-06 PROCEDURE — 99232 SBSQ HOSP IP/OBS MODERATE 35: CPT | Performed by: PSYCHIATRY & NEUROLOGY

## 2023-09-06 RX ADMIN — HYDROXYZINE HYDROCHLORIDE 50 MG: 50 TABLET ORAL at 21:28

## 2023-09-06 RX ADMIN — IBUPROFEN 400 MG: 400 TABLET, FILM COATED ORAL at 08:28

## 2023-09-06 RX ADMIN — ONDANSETRON HYDROCHLORIDE 4 MG: 4 TABLET, FILM COATED ORAL at 14:27

## 2023-09-06 RX ADMIN — CYCLOBENZAPRINE 10 MG: 10 TABLET, FILM COATED ORAL at 21:28

## 2023-09-06 RX ADMIN — ONDANSETRON HYDROCHLORIDE 4 MG: 4 TABLET, FILM COATED ORAL at 08:28

## 2023-09-06 RX ADMIN — TRAZODONE HYDROCHLORIDE 50 MG: 50 TABLET ORAL at 21:28

## 2023-09-06 RX ADMIN — HYDROXYZINE HYDROCHLORIDE 50 MG: 50 TABLET ORAL at 08:28

## 2023-09-06 RX ADMIN — CYCLOBENZAPRINE 10 MG: 10 TABLET, FILM COATED ORAL at 08:28

## 2023-09-06 RX ADMIN — DICYCLOMINE HYDROCHLORIDE 10 MG: 10 CAPSULE ORAL at 08:28

## 2023-09-06 NOTE — PLAN OF CARE
Goal Outcome Evaluation:        Problem: Adult Behavioral Health Plan of Care  Goal: Patient-Specific Goal (Individualization)  Outcome: Ongoing, Progressing  Flowsheets  Taken 9/5/2023 1650 by Claudia Raza LCSW  Patient-Specific Goals (Include Timeframe): Anatoly to complete medical detox prior to discharge, identify 1-2 healthy coping skills to assist with relapse prevention during patients 3-7 day hospital stay, engage in safe disposition planning prior to discharge  Individualized Care Needs: Medication management, individual and group therapy.  Anxieties, Fears or Concerns: none reported  Taken 9/5/2023 1633 by Claudia Raza LCSW  Patient Personal Strengths:   expressive of needs   motivated for treatment   expressive of emotions   resourceful  Patient Vulnerabilities: substance abuse/addiction                DATA: Therapist met with Patient individually on this date. Therapist introduced self as covering therapist and explained that Patient's primary therapist would not be present today. Patient agreeable to discuss treatment progress and discharge concerns.     CLINICAL MANUVERING/INTERVENTIONS: Assisted Patient in processing session content; acknowledged and normalized Patient’s thoughts, feelings, and concerns by utilizing a person-centered approach in efforts to build appropriate rapport and a positive therapeutic relationship with open and honest communication. Allowed Patient to ventilate regarding current stressors and triggers for negative emotions and thoughts in a safe nonjudgmental environment with unconditional positive regard, active listening skills, and empathy. Therapist implemented motivational interviewing techniques to assist Patient with exploring personal growth and change and discussed distress tolerance skills, self soothing techniques, and applied cognitive behavioral strategies to facilitate identification of maladaptive patterns of thinking and behavior.Therapist  utilized dialectical behavior techniques to teach and model emotional regulation and relaxation methods. Therapist assisted Patient with identifying and implementing healthier coping strategies. Therapist assisted Patient with safety planning; Patient agreed to continue honest communication with Treatment Team while inpatient and identify any SI/HI.  Patient encouraged to seek nearest ER or contact 911 if danger to self or others post discharge.     ASSESSMENT:    Therapist met with patient on this date, patient continues to receive treatment for detox. Patient reports feeling depressed and anxious, denies current SI/HI/AVH. Patient states plan to return home at discharge and is not agreeable to aftercare. Patient denies having any additional needs at this time.     PLAN: Patient will continue stabilization. Patient will continue to receive services offered by Treatment Team.     Therapist recommends [facility] residential rehab or outpatient services. Patient plans to return home and is not agreeable to aftercare.

## 2023-09-06 NOTE — PLAN OF CARE
Goal Outcome Evaluation:  Plan of Care Reviewed With: patient  Patient Agreement with Plan of Care: agrees     Progress: no change  Outcome Evaluation: Pt reports poor sleep and appetite. Rates anxiety 9/10 and depression 7/10. Denies SI/HI and AVH.

## 2023-09-06 NOTE — PLAN OF CARE
Goal Outcome Evaluation:  Plan of Care Reviewed With: patient           Outcome Evaluation: Pt calm and cooperative this shift. Most of day spent in bed due to bodyaches and cold sweats. Denies other complaints. No distress noted.

## 2023-09-06 NOTE — PROGRESS NOTES
"INPATIENT PSYCHIATRIC PROGRESS NOTE    Name:  Anatoly Buenrostro  :  1975  MRN:  [id number]  Visit Number:  [id number]  Length of stay:  1    SUBJECTIVE    CC/Focus of Exam: opioid and meth use    INTERVAL HISTORY:  The patient reports he is feeling some better but is experiencing ongoing withdrawals. He states the medications are helping with the symptoms.  Depression rating 5/10  Anxiety rating 5/10  Sleep:good  Withdrawal sx: body aches  Cravin/10    Review of Systems   Constitutional:  Positive for chills, diaphoresis and fatigue.   Musculoskeletal:  Positive for myalgias.   Neurological:  Positive for tremors and weakness.   Psychiatric/Behavioral:  Positive for dysphoric mood. The patient is nervous/anxious.      OBJECTIVE    Temp:  [98.7 °F (37.1 °C)-99.1 °F (37.3 °C)] 98.8 °F (37.1 °C)  Heart Rate:  [66-81] 66  Resp:  [16-18] 16  BP: (106-128)/(61-87) 110/61    MENTAL STATUS EXAM:  Appearance:Casually dressed, good hygeine.   Cooperation:Cooperative  Psychomotor: No psychomotor agitation/retardation, No EPS, No motor tics  Speech-normal rate, amount.  Mood \"anxious\"   Affect-congruent, appropriate, stable  Thought Content-goal directed, no delusional material present  Thought process-linear, organized.  Suicidality: No SI  Homicidality: No HI  Perception: No AH/VH  Insight-fair   Judgement-fair    Lab Results (last 24 hours)       Procedure Component Value Units Date/Time    Hepatitis Panel, Acute [[id number]]  (Normal) Collected: 23 1426    Specimen: Blood Updated: 23 1548     Hepatitis B Surface Ag Non-Reactive     Hep A IgM Non-Reactive     Hep B C IgM Non-Reactive     Hepatitis C Ab Non-Reactive    Narrative:      Results may be falsely decreased if patient taking Biotin.                Imaging Results (Last 24 Hours)       ** No results found for the last 24 hours. **               ECG/EMG Results (most recent)       Procedure Component Value Units Date/Time    ECG 12 Lead Other; " Baseline Cardiac Status [[id number]] Collected: 09/05/23 1916     Updated: 09/05/23 1918     QT Interval 414 ms      QTC Interval 480 ms     Narrative:      Test Reason : Other~  Blood Pressure :   */*   mmHG  Vent. Rate :  81 BPM     Atrial Rate :  81 BPM     P-R Int : 132 ms          QRS Dur : 106 ms      QT Int : 414 ms       P-R-T Axes :  67  78  59 degrees     QTc Int : 480 ms    Normal sinus rhythm  Prolonged QT  Abnormal ECG  No previous ECGs available    Referred By:            Confirmed By:              ALLERGIES: Patient has no known allergies.    Medication Review:   Scheduled Medications:  nicotine, 1 patch, Transdermal, Q24H         PRN Medications:    acetaminophen    aluminum-magnesium hydroxide-simethicone    benzonatate    benztropine **OR** benztropine    cloNIDine **FOLLOWED BY** [START ON 9/7/2023] cloNIDine **FOLLOWED BY** [START ON 9/8/2023] cloNIDine **FOLLOWED BY** [START ON 9/9/2023] cloNIDine    cyclobenzaprine    dicyclomine    famotidine    hydrALAZINE    hydrOXYzine    ibuprofen    loperamide    magnesium hydroxide    ondansetron    sodium chloride    traZODone   All medications reviewed.    ASSESSMENT & PLAN:      Opioid use disorder, severe, dependence    Opioid withdrawal  -Clonidine detox  -Comfort meds       Methamphetamine use disorder, severe  -Supportive treatment       Nicotine use disorder  -Encourage cessation    Special precautions: Special Precautions Level 4 (q30 min checks).    Behavioral Health Treatment Plan and Problem List: I have reviewed and approved the Behavioral Health Treatment Plan and Problem list.  The patient has had a chance to review and agrees with the treatment plan.    Copied text in portions of this note has been reviewed and is accurate as of 09/06/23         Clinician:  Thia Alvarez MD  09/06/23  14:59 EDT

## 2023-09-07 VITALS
SYSTOLIC BLOOD PRESSURE: 127 MMHG | HEIGHT: 70 IN | OXYGEN SATURATION: 98 % | HEART RATE: 103 BPM | WEIGHT: 148 LBS | BODY MASS INDEX: 21.19 KG/M2 | RESPIRATION RATE: 18 BRPM | TEMPERATURE: 98.5 F | DIASTOLIC BLOOD PRESSURE: 94 MMHG

## 2023-09-07 LAB
QT INTERVAL: 414 MS
QTC INTERVAL: 480 MS

## 2023-09-07 PROCEDURE — 99238 HOSP IP/OBS DSCHRG MGMT 30/<: CPT | Performed by: PSYCHIATRY & NEUROLOGY

## 2023-09-07 NOTE — DISCHARGE SUMMARY
":  1975  MRN:  [id number]  Visit Number:  [id number]      Date of Admission:2023   Date of Discharge:  2023    Discharge Diagnosis:  Active Problems:    Opioid use disorder, severe, dependence    Opioid withdrawal    Methamphetamine use disorder, severe    Nicotine use disorder        Admission Diagnosis:  Substance abuse [F19.10]     KIMMY Buenrostro is a 47 y.o. male who was admitted on 2023 with complaints of opioid and meth use and withdrawals.   For details please see H&P dated 23    Hospital Course  Patient is a 47 y.o. male presented with opioid and meth use and withdrawals.  The patient was admitted to the detox recovery unit.  He was started on clonidine detox.  Patient had a rough couple of days but then on day 3 he reported feeling a whole lot better.  He denied any active withdrawals.  His sleep and appetite were good.  He felt ready to go back home and resume work.  For aftercare he reported that he will get help from his daughter and set up long-term rehab treatment after he started work.    Mental Status Exam upon discharge:   Mood \"good\"   Affect-congruent, appropriate, stable  Thought Content-goal directed, no delusional material present  Thought process-linear, organized.  Suicidality: No SI  Homicidality: No HI  Perception: No AH/VH    Procedures Performed         Consults:   Consults       No orders found from 2023 to 2023.            Pertinent Test Results:   Admission on 2023   Component Date Value Ref Range Status    QT Interval 2023 414  ms Final    QTC Interval 2023 480  ms Final    Hepatitis B Surface Ag 2023 Non-Reactive  Non-Reactive Final    Hep A IgM 2023 Non-Reactive  Non-Reactive Final    Hep B C IgM 2023 Non-Reactive  Non-Reactive Final    Hepatitis C Ab 2023 Non-Reactive  Non-Reactive Final   Admission on 2023, Discharged on 2023   Component Date Value Ref Range Status    Glucose 2023 133 " (H)  65 - 99 mg/dL Final    BUN 09/05/2023 12  6 - 20 mg/dL Final    Creatinine 09/05/2023 0.66 (L)  0.76 - 1.27 mg/dL Final    Sodium 09/05/2023 139  136 - 145 mmol/L Final    Potassium 09/05/2023 3.5  3.5 - 5.2 mmol/L Final    Slight hemolysis detected by analyzer. Results may be affected.    Chloride 09/05/2023 104  98 - 107 mmol/L Final    CO2 09/05/2023 23.1  22.0 - 29.0 mmol/L Final    Calcium 09/05/2023 9.8  8.6 - 10.5 mg/dL Final    Total Protein 09/05/2023 7.9  6.0 - 8.5 g/dL Final    Albumin 09/05/2023 4.4  3.5 - 5.2 g/dL Final    ALT (SGPT) 09/05/2023 16  1 - 41 U/L Final    AST (SGOT) 09/05/2023 22  1 - 40 U/L Final    Alkaline Phosphatase 09/05/2023 83  39 - 117 U/L Final    Total Bilirubin 09/05/2023 0.3  0.0 - 1.2 mg/dL Final    Globulin 09/05/2023 3.5  gm/dL Final    A/G Ratio 09/05/2023 1.3  g/dL Final    BUN/Creatinine Ratio 09/05/2023 18.2  7.0 - 25.0 Final    Anion Gap 09/05/2023 11.9  5.0 - 15.0 mmol/L Final    eGFR 09/05/2023 116.4  >60.0 mL/min/1.73 Final    Color, UA 09/05/2023 Yellow  Yellow, Straw Final    Appearance, UA 09/05/2023 Clear  Clear Final    pH, UA 09/05/2023 6.5  5.0 - 8.0 Final    Specific Gravity, UA 09/05/2023 1.029  1.005 - 1.030 Final    Glucose, UA 09/05/2023 Negative  Negative Final    Ketones, UA 09/05/2023 15 mg/dL (1+) (A)  Negative Final    Bilirubin, UA 09/05/2023 Negative  Negative Final    Blood, UA 09/05/2023 Negative  Negative Final    Protein, UA 09/05/2023 Trace (A)  Negative Final    Leuk Esterase, UA 09/05/2023 Trace (A)  Negative Final    Nitrite, UA 09/05/2023 Negative  Negative Final    Urobilinogen, UA 09/05/2023 1.0 E.U./dL  0.2 - 1.0 E.U./dL Final    THC, Screen, Urine 09/05/2023 Positive (A)  Negative Final    Phencyclidine (PCP), Urine 09/05/2023 Negative  Negative Final    Cocaine Screen, Urine 09/05/2023 Negative  Negative Final    Methamphetamine, Ur 09/05/2023 Positive (A)  Negative Final    Opiate Screen 09/05/2023 Negative  Negative Final     Amphetamine Screen, Urine 09/05/2023 Negative  Negative Final    Benzodiazepine Screen, Urine 09/05/2023 Negative  Negative Final    Tricyclic Antidepressants Screen 09/05/2023 Negative  Negative Final    Methadone Screen, Urine 09/05/2023 Negative  Negative Final    Barbiturates Screen, Urine 09/05/2023 Negative  Negative Final    Oxycodone Screen, Urine 09/05/2023 Negative  Negative Final    Propoxyphene Screen 09/05/2023 Negative  Negative Final    Buprenorphine, Screen, Urine 09/05/2023 Positive (A)  Negative Final    Magnesium 09/05/2023 2.1  1.6 - 2.6 mg/dL Final    Ethanol 09/05/2023 <10  0 - 10 mg/dL Final    Ethanol % 09/05/2023 <0.010  % Final    WBC 09/05/2023 8.89  3.40 - 10.80 10*3/mm3 Final    RBC 09/05/2023 4.51  4.14 - 5.80 10*6/mm3 Final    Hemoglobin 09/05/2023 13.3  13.0 - 17.7 g/dL Final    Hematocrit 09/05/2023 40.9  37.5 - 51.0 % Final    MCV 09/05/2023 90.7  79.0 - 97.0 fL Final    MCH 09/05/2023 29.5  26.6 - 33.0 pg Final    MCHC 09/05/2023 32.5  31.5 - 35.7 g/dL Final    RDW 09/05/2023 13.3  12.3 - 15.4 % Final    RDW-SD 09/05/2023 44.2  37.0 - 54.0 fl Final    MPV 09/05/2023 9.0  6.0 - 12.0 fL Final    Platelets 09/05/2023 451 (H)  140 - 450 10*3/mm3 Final    Neutrophil % 09/05/2023 84.2 (H)  42.7 - 76.0 % Final    Lymphocyte % 09/05/2023 10.9 (L)  19.6 - 45.3 % Final    Monocyte % 09/05/2023 4.0 (L)  5.0 - 12.0 % Final    Eosinophil % 09/05/2023 0.0 (L)  0.3 - 6.2 % Final    Basophil % 09/05/2023 0.6  0.0 - 1.5 % Final    Immature Grans % 09/05/2023 0.3  0.0 - 0.5 % Final    Neutrophils, Absolute 09/05/2023 7.48 (H)  1.70 - 7.00 10*3/mm3 Final    Lymphocytes, Absolute 09/05/2023 0.97  0.70 - 3.10 10*3/mm3 Final    Monocytes, Absolute 09/05/2023 0.36  0.10 - 0.90 10*3/mm3 Final    Eosinophils, Absolute 09/05/2023 0.00  0.00 - 0.40 10*3/mm3 Final    Basophils, Absolute 09/05/2023 0.05  0.00 - 0.20 10*3/mm3 Final    Immature Grans, Absolute 09/05/2023 0.03  0.00 - 0.05 10*3/mm3 Final    nRBC  09/05/2023 0.0  0.0 - 0.2 /100 WBC Final    Fentanyl, Urine 09/05/2023 Positive (A)  Negative Final    RBC, UA 09/05/2023 None Seen  None Seen, 0-2 /HPF Final    WBC, UA 09/05/2023 0-2  None Seen, 0-2 /HPF Final    Bacteria, UA 09/05/2023 Trace (A)  None Seen /HPF Final    Squamous Epithelial Cells, UA 09/05/2023 0-2  None Seen, 0-2 /HPF Final    Hyaline Casts, UA 09/05/2023 0-2  None Seen /LPF Final    Calcium Oxalate Crystals, UA 09/05/2023 Moderate/2+  None Seen /HPF Final    Mucus, UA 09/05/2023 Trace  None Seen, Trace /HPF Final    Methodology 09/05/2023 Manual Light Microscopy   Final        Condition on Discharge:  improved    Vital Signs  Temp:  [98.8 °F (37.1 °C)-100 °F (37.8 °C)] 99.2 °F (37.3 °C)  Heart Rate:  [72-86] 86  Resp:  [18] 18  BP: (113-134)/(73-89) 131/89      Discharge Disposition:  Home or Self Care    Discharge Medications:     Discharge Medications      Patient Not Prescribed Medications Upon Discharge         Discharge Diet: Regular     Activity at Discharge: As tolerated     Follow-up Appointments  Patient states he would set up his own rehab treatment.       Time spent in discharge: < 30 min    Clinician:   Thai Alvarez MD  09/07/23  14:06 EDT

## 2023-09-07 NOTE — PLAN OF CARE
Problem: Adult Behavioral Health Plan of Care  Goal: Develops/Participates in Therapeutic [place] to Support Successful Transition  Intervention: Mutually Develop Transition Plan  Recent Flowsheet Documentation  Taken 9/7/2023 1546 by Claudia Raza LCSW  Transportation Anticipated: public transportation  Transportation Concerns: no car  Current Discharge Risk: substance use/abuse  Concerns to be Addressed:   homelessness   substance/tobacco abuse/use  Readmission Within the Last 30 Days: no previous admission in last 30 days  Patient/Family Anticipated Services at Transition: none  Patient's Choice of Community Agency(s): patient refuses  Offered/Gave Vendor List: no      Patient requesting discharge today.  He denies SI/HI.  He reports decrease in anxiety and depression.  He denies withdrawals and cravings.  Reviewed healthy coping, safety and the importance of aftercare.  Patient is not agreeable to any kind of aftercare.  He reports he has no means of transportation and requests assistance with a ride to [address] [place] [address].  Therapist contacted -[facility] and scheduled patient a ride today.

## 2023-09-07 NOTE — PLAN OF CARE
Goal Outcome Evaluation:  Plan of Care Reviewed With: patient  Patient Agreement with Plan of Care: agrees     Progress: improving       Slept well and  appetite poor for shift.  Pt given flexeril, atarax, and trazodone prn medications.